# Patient Record
Sex: MALE | Race: WHITE | NOT HISPANIC OR LATINO
[De-identification: names, ages, dates, MRNs, and addresses within clinical notes are randomized per-mention and may not be internally consistent; named-entity substitution may affect disease eponyms.]

---

## 2021-05-03 ENCOUNTER — APPOINTMENT (OUTPATIENT)
Dept: DERMATOLOGY | Facility: CLINIC | Age: 36
End: 2021-05-03

## 2021-09-01 ENCOUNTER — EMERGENCY (EMERGENCY)
Facility: HOSPITAL | Age: 36
LOS: 1 days | Discharge: HOME | End: 2021-09-01
Attending: EMERGENCY MEDICINE
Payer: MEDICAID

## 2021-09-01 VITALS
OXYGEN SATURATION: 100 % | DIASTOLIC BLOOD PRESSURE: 90 MMHG | WEIGHT: 220.02 LBS | SYSTOLIC BLOOD PRESSURE: 136 MMHG | TEMPERATURE: 98 F | RESPIRATION RATE: 18 BRPM | HEIGHT: 74 IN | HEART RATE: 89 BPM

## 2021-09-01 LAB
ALBUMIN SERPL ELPH-MCNC: 4.9 G/DL — SIGNIFICANT CHANGE UP (ref 3.3–5.2)
ALP SERPL-CCNC: 78 U/L — SIGNIFICANT CHANGE UP (ref 40–120)
ALT FLD-CCNC: 34 U/L — SIGNIFICANT CHANGE UP
ANION GAP SERPL CALC-SCNC: 21 MMOL/L — HIGH (ref 5–17)
AST SERPL-CCNC: 35 U/L — SIGNIFICANT CHANGE UP
BASOPHILS # BLD AUTO: 0.03 K/UL — SIGNIFICANT CHANGE UP (ref 0–0.2)
BASOPHILS NFR BLD AUTO: 0.3 % — SIGNIFICANT CHANGE UP (ref 0–2)
BILIRUB SERPL-MCNC: 0.5 MG/DL — SIGNIFICANT CHANGE UP (ref 0.4–2)
BUN SERPL-MCNC: 21.5 MG/DL — HIGH (ref 8–20)
CALCIUM SERPL-MCNC: 9.9 MG/DL — SIGNIFICANT CHANGE UP (ref 8.6–10.2)
CHLORIDE SERPL-SCNC: 98 MMOL/L — SIGNIFICANT CHANGE UP (ref 98–107)
CO2 SERPL-SCNC: 21 MMOL/L — LOW (ref 22–29)
CREAT SERPL-MCNC: 1.21 MG/DL — SIGNIFICANT CHANGE UP (ref 0.5–1.3)
EOSINOPHIL # BLD AUTO: 0.19 K/UL — SIGNIFICANT CHANGE UP (ref 0–0.5)
EOSINOPHIL NFR BLD AUTO: 2.1 % — SIGNIFICANT CHANGE UP (ref 0–6)
GLUCOSE SERPL-MCNC: 143 MG/DL — HIGH (ref 70–99)
HCT VFR BLD CALC: 42.3 % — SIGNIFICANT CHANGE UP (ref 39–50)
HGB BLD-MCNC: 15.2 G/DL — SIGNIFICANT CHANGE UP (ref 13–17)
IMM GRANULOCYTES NFR BLD AUTO: 0.3 % — SIGNIFICANT CHANGE UP (ref 0–1.5)
LYMPHOCYTES # BLD AUTO: 2.72 K/UL — SIGNIFICANT CHANGE UP (ref 1–3.3)
LYMPHOCYTES # BLD AUTO: 29.4 % — SIGNIFICANT CHANGE UP (ref 13–44)
MCHC RBC-ENTMCNC: 31.1 PG — SIGNIFICANT CHANGE UP (ref 27–34)
MCHC RBC-ENTMCNC: 35.9 GM/DL — SIGNIFICANT CHANGE UP (ref 32–36)
MCV RBC AUTO: 86.5 FL — SIGNIFICANT CHANGE UP (ref 80–100)
MONOCYTES # BLD AUTO: 1.2 K/UL — HIGH (ref 0–0.9)
MONOCYTES NFR BLD AUTO: 13 % — SIGNIFICANT CHANGE UP (ref 2–14)
NEUTROPHILS # BLD AUTO: 5.09 K/UL — SIGNIFICANT CHANGE UP (ref 1.8–7.4)
NEUTROPHILS NFR BLD AUTO: 54.9 % — SIGNIFICANT CHANGE UP (ref 43–77)
PLATELET # BLD AUTO: 261 K/UL — SIGNIFICANT CHANGE UP (ref 150–400)
POTASSIUM SERPL-MCNC: 4.1 MMOL/L — SIGNIFICANT CHANGE UP (ref 3.5–5.3)
POTASSIUM SERPL-SCNC: 4.1 MMOL/L — SIGNIFICANT CHANGE UP (ref 3.5–5.3)
PROT SERPL-MCNC: 8 G/DL — SIGNIFICANT CHANGE UP (ref 6.6–8.7)
RBC # BLD: 4.89 M/UL — SIGNIFICANT CHANGE UP (ref 4.2–5.8)
RBC # FLD: 11.5 % — SIGNIFICANT CHANGE UP (ref 10.3–14.5)
SODIUM SERPL-SCNC: 140 MMOL/L — SIGNIFICANT CHANGE UP (ref 135–145)
WBC # BLD: 9.26 K/UL — SIGNIFICANT CHANGE UP (ref 3.8–10.5)
WBC # FLD AUTO: 9.26 K/UL — SIGNIFICANT CHANGE UP (ref 3.8–10.5)

## 2021-09-01 PROCEDURE — 36415 COLL VENOUS BLD VENIPUNCTURE: CPT

## 2021-09-01 PROCEDURE — 85025 COMPLETE CBC W/AUTO DIFF WBC: CPT

## 2021-09-01 PROCEDURE — 99284 EMERGENCY DEPT VISIT MOD MDM: CPT

## 2021-09-01 PROCEDURE — 99284 EMERGENCY DEPT VISIT MOD MDM: CPT | Mod: 25

## 2021-09-01 PROCEDURE — 80053 COMPREHEN METABOLIC PANEL: CPT

## 2021-09-01 PROCEDURE — 96374 THER/PROPH/DIAG INJ IV PUSH: CPT

## 2021-09-01 PROCEDURE — 96375 TX/PRO/DX INJ NEW DRUG ADDON: CPT

## 2021-09-01 RX ORDER — METOCLOPRAMIDE HCL 10 MG
10 TABLET ORAL ONCE
Refills: 0 | Status: COMPLETED | OUTPATIENT
Start: 2021-09-01 | End: 2021-09-01

## 2021-09-01 RX ORDER — ONDANSETRON 8 MG/1
4 TABLET, FILM COATED ORAL ONCE
Refills: 0 | Status: COMPLETED | OUTPATIENT
Start: 2021-09-01 | End: 2021-09-01

## 2021-09-01 RX ADMIN — ONDANSETRON 4 MILLIGRAM(S): 8 TABLET, FILM COATED ORAL at 19:59

## 2021-09-01 RX ADMIN — Medication 104 MILLIGRAM(S): at 21:12

## 2021-09-01 NOTE — ED PROVIDER NOTE - PROGRESS NOTE DETAILS
Pt. re-evaluated. Pt. states that he is less noxious. PT. however just vomited. IV reglan will be ordered. Father currently at the bedside. PT. now remembers that he snorted  some "Oxys". PT. denied any Suicidal/Homicidal ideations. Pt. feeling better. NO vomiting. No more nausea. Mother at the bedside and will take pt. home.

## 2021-09-01 NOTE — ED ADULT TRIAGE NOTE - CHIEF COMPLAINT QUOTE
pt presents to ED s/p opiate OD. as per EMS, pt was found unresponsive and choking with airway obstruction as he was eating prior to event. food removed from airway by ems. received 4mg nasal Narcan, 2mg IV Narcan with positive effect by EMS. pt A&Ox4 on arrival. MD Mcintyre called to bedside. pt changed to yellow gown.

## 2021-09-01 NOTE — ED PROVIDER NOTE - PATIENT PORTAL LINK FT
You can access the FollowMyHealth Patient Portal offered by [facility] by registering at the following website: [email or web address]. By joining [facility]’s FollowMyHealth portal, you will also be able to view your health information using other applications (apps) compatible with our system.

## 2021-09-01 NOTE — ED PROVIDER NOTE - OBJECTIVE STATEMENT
PT. with hx of substance abuse present to the ED after he was found unresponsive in his room. PT. had pinpoint pupils and  was "blue" appearing. PT. also had some food in his airway that was removed by EMS. PT. was then given 4mg of Intranasal Narcan and then he received 2mg IV Narcan. Pt. responded to that dose. Pt. then proceeded to pull out his IV. Pt. also vomited prior to coming to the ED. PT. in the ED, denies any shortness of breath. PT. was a little confused on the year(thought it was 2022), but he knew his name and where he was.

## 2021-09-01 NOTE — ED PROVIDER NOTE - CLINICAL SUMMARY MEDICAL DECISION MAKING FREE TEXT BOX
PT. s/p opiate overdose. PT. received narcan and responded. PT. c/o nausea. NO SOB. No chest pain. Father with patient. Will continue to observe.
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
## 2021-09-03 DIAGNOSIS — R41.0 DISORIENTATION, UNSPECIFIED: ICD-10-CM
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
## 2021-09-03 DIAGNOSIS — T40.601A POISONING BY UNSPECIFIED NARCOTICS, ACCIDENTAL (UNINTENTIONAL), INITIAL ENCOUNTER: ICD-10-CM

## 2023-04-21 PROBLEM — F19.10 OTHER PSYCHOACTIVE SUBSTANCE ABUSE, UNCOMPLICATED: Chronic | Status: ACTIVE | Noted: 2021-09-01

## 2023-04-21 PROBLEM — Z00.00 ENCOUNTER FOR PREVENTIVE HEALTH EXAMINATION: Status: ACTIVE | Noted: 2023-04-21

## 2023-05-01 ENCOUNTER — APPOINTMENT (OUTPATIENT)
Dept: SURGERY | Facility: CLINIC | Age: 38
End: 2023-05-01
Payer: MEDICAID

## 2023-05-01 VITALS
DIASTOLIC BLOOD PRESSURE: 81 MMHG | TEMPERATURE: 96.4 F | OXYGEN SATURATION: 97 % | RESPIRATION RATE: 16 BRPM | BODY MASS INDEX: 31.81 KG/M2 | SYSTOLIC BLOOD PRESSURE: 128 MMHG | HEART RATE: 76 BPM | WEIGHT: 240 LBS | HEIGHT: 73 IN

## 2023-05-01 DIAGNOSIS — R10.31 RIGHT LOWER QUADRANT PAIN: ICD-10-CM

## 2023-05-01 PROCEDURE — 99204 OFFICE O/P NEW MOD 45 MIN: CPT

## 2023-05-01 NOTE — HISTORY OF PRESENT ILLNESS
[de-identified] : This patient is a pleasant 37-year-old male with no significant past medical history who presents for evaluation of right groin pain that first started 2 months ago while he was playing hockey.  The patient states that he made a sudden lateral extension move with his left leg and immediately began experiencing pain in the groin region.  In the past 2 months the pain has been slowly subsiding.  He does not feel a bulge in the area.  He recently underwent an ultrasound where he was told that he had a 1 cm "tear" in the muscle.  He is eating well and having normal bowel function.  No testicular pain or swelling in the scrotum.  No dysuria.

## 2023-05-01 NOTE — PHYSICAL EXAM
[JVD] : no jugular venous distention  [Normal Breath Sounds] : Normal breath sounds [Normal Heart Sounds] : normal heart sounds [Normal Rate and Rhythm] : normal rate and rhythm [Abdominal Masses] : No abdominal masses [Abdomen Tenderness] : ~T ~M No abdominal tenderness [No HSM] : no hepatosplenomegaly [No Rash or Lesion] : No rash or lesion [Alert] : alert [Oriented to Person] : oriented to person [Oriented to Place] : oriented to place [Oriented to Time] : oriented to time [Calm] : calm [de-identified] : Well-appearing, no acute distress [de-identified] : PROSPER [de-identified] : Soft, nondistended, nontender, no discrete hernias palpated.  No point tenderness at pubic tubercle

## 2023-05-01 NOTE — ASSESSMENT
[FreeTextEntry1] : In brief, this patient is a pleasant 37-year-old male who presents with right groin pain after an extension injury while playing hockey.  On exam I cannot feel a discrete hernia and I believe the patient has a sports hernia, which is a muscle strain and not a true abdominal wall defect.  He states the pain is already improving I encouraged him to use daily warm compresses to the area.  I have referred him to pain management and physical therapy to help accelerate his healing.  If his pain does not resolve he should return to discuss possible surgery as a definitive management.

## 2023-07-16 ENCOUNTER — EMERGENCY (EMERGENCY)
Facility: HOSPITAL | Age: 38
LOS: 1 days | Discharge: HOME | End: 2023-07-16
Attending: EMERGENCY MEDICINE
Payer: MEDICAID

## 2023-07-16 VITALS
HEART RATE: 111 BPM | SYSTOLIC BLOOD PRESSURE: 132 MMHG | RESPIRATION RATE: 16 BRPM | HEIGHT: 75 IN | DIASTOLIC BLOOD PRESSURE: 61 MMHG | OXYGEN SATURATION: 100 % | TEMPERATURE: 98 F | WEIGHT: 229.94 LBS

## 2023-07-16 VITALS
RESPIRATION RATE: 16 BRPM | OXYGEN SATURATION: 98 % | DIASTOLIC BLOOD PRESSURE: 55 MMHG | HEART RATE: 81 BPM | TEMPERATURE: 98 F | SYSTOLIC BLOOD PRESSURE: 98 MMHG

## 2023-07-16 LAB
ALBUMIN SERPL ELPH-MCNC: 4.4 G/DL — SIGNIFICANT CHANGE UP (ref 3.3–5.2)
ALP SERPL-CCNC: 75 U/L — SIGNIFICANT CHANGE UP (ref 40–120)
ALT FLD-CCNC: 27 U/L — SIGNIFICANT CHANGE UP
ANION GAP SERPL CALC-SCNC: 15 MMOL/L — SIGNIFICANT CHANGE UP (ref 5–17)
APAP SERPL-MCNC: <3 UG/ML — LOW (ref 10–26)
APTT BLD: 24 SEC — LOW (ref 27.5–35.5)
AST SERPL-CCNC: 47 U/L — HIGH
BASOPHILS # BLD AUTO: 0.03 K/UL — SIGNIFICANT CHANGE UP (ref 0–0.2)
BASOPHILS NFR BLD AUTO: 0.2 % — SIGNIFICANT CHANGE UP (ref 0–2)
BILIRUB SERPL-MCNC: 0.4 MG/DL — SIGNIFICANT CHANGE UP (ref 0.4–2)
BUN SERPL-MCNC: 20.5 MG/DL — HIGH (ref 8–20)
CALCIUM SERPL-MCNC: 8.8 MG/DL — SIGNIFICANT CHANGE UP (ref 8.4–10.5)
CHLORIDE SERPL-SCNC: 97 MMOL/L — SIGNIFICANT CHANGE UP (ref 96–108)
CO2 SERPL-SCNC: 24 MMOL/L — SIGNIFICANT CHANGE UP (ref 22–29)
CREAT SERPL-MCNC: 1.05 MG/DL — SIGNIFICANT CHANGE UP (ref 0.5–1.3)
EGFR: 93 ML/MIN/1.73M2 — SIGNIFICANT CHANGE UP
EOSINOPHIL # BLD AUTO: 0 K/UL — SIGNIFICANT CHANGE UP (ref 0–0.5)
EOSINOPHIL NFR BLD AUTO: 0 % — SIGNIFICANT CHANGE UP (ref 0–6)
ETHANOL SERPL-MCNC: <10 MG/DL — SIGNIFICANT CHANGE UP (ref 0–9)
GLUCOSE SERPL-MCNC: 96 MG/DL — SIGNIFICANT CHANGE UP (ref 70–99)
HCT VFR BLD CALC: 40.2 % — SIGNIFICANT CHANGE UP (ref 39–50)
HGB BLD-MCNC: 13.7 G/DL — SIGNIFICANT CHANGE UP (ref 13–17)
IMM GRANULOCYTES NFR BLD AUTO: 0.7 % — SIGNIFICANT CHANGE UP (ref 0–0.9)
INR BLD: 1.13 RATIO — SIGNIFICANT CHANGE UP (ref 0.88–1.16)
LYMPHOCYTES # BLD AUTO: 0.83 K/UL — LOW (ref 1–3.3)
LYMPHOCYTES # BLD AUTO: 6.2 % — LOW (ref 13–44)
MCHC RBC-ENTMCNC: 30.7 PG — SIGNIFICANT CHANGE UP (ref 27–34)
MCHC RBC-ENTMCNC: 34.1 GM/DL — SIGNIFICANT CHANGE UP (ref 32–36)
MCV RBC AUTO: 90.1 FL — SIGNIFICANT CHANGE UP (ref 80–100)
MONOCYTES # BLD AUTO: 0.53 K/UL — SIGNIFICANT CHANGE UP (ref 0–0.9)
MONOCYTES NFR BLD AUTO: 3.9 % — SIGNIFICANT CHANGE UP (ref 2–14)
NEUTROPHILS # BLD AUTO: 11.94 K/UL — HIGH (ref 1.8–7.4)
NEUTROPHILS NFR BLD AUTO: 89 % — HIGH (ref 43–77)
PLATELET # BLD AUTO: 253 K/UL — SIGNIFICANT CHANGE UP (ref 150–400)
POTASSIUM SERPL-MCNC: 4.3 MMOL/L — SIGNIFICANT CHANGE UP (ref 3.5–5.3)
POTASSIUM SERPL-SCNC: 4.3 MMOL/L — SIGNIFICANT CHANGE UP (ref 3.5–5.3)
PROT SERPL-MCNC: 6.9 G/DL — SIGNIFICANT CHANGE UP (ref 6.6–8.7)
PROTHROM AB SERPL-ACNC: 13.1 SEC — SIGNIFICANT CHANGE UP (ref 10.5–13.4)
RBC # BLD: 4.46 M/UL — SIGNIFICANT CHANGE UP (ref 4.2–5.8)
RBC # FLD: 11.9 % — SIGNIFICANT CHANGE UP (ref 10.3–14.5)
SALICYLATES SERPL-MCNC: <0.6 MG/DL — LOW (ref 10–20)
SODIUM SERPL-SCNC: 136 MMOL/L — SIGNIFICANT CHANGE UP (ref 135–145)
WBC # BLD: 13.43 K/UL — HIGH (ref 3.8–10.5)
WBC # FLD AUTO: 13.43 K/UL — HIGH (ref 3.8–10.5)

## 2023-07-16 PROCEDURE — 36415 COLL VENOUS BLD VENIPUNCTURE: CPT

## 2023-07-16 PROCEDURE — 99285 EMERGENCY DEPT VISIT HI MDM: CPT

## 2023-07-16 PROCEDURE — 71045 X-RAY EXAM CHEST 1 VIEW: CPT | Mod: 26

## 2023-07-16 PROCEDURE — 93005 ELECTROCARDIOGRAM TRACING: CPT

## 2023-07-16 PROCEDURE — 85025 COMPLETE CBC W/AUTO DIFF WBC: CPT

## 2023-07-16 PROCEDURE — 71045 X-RAY EXAM CHEST 1 VIEW: CPT

## 2023-07-16 PROCEDURE — 96374 THER/PROPH/DIAG INJ IV PUSH: CPT

## 2023-07-16 PROCEDURE — 85730 THROMBOPLASTIN TIME PARTIAL: CPT

## 2023-07-16 PROCEDURE — 82962 GLUCOSE BLOOD TEST: CPT

## 2023-07-16 PROCEDURE — 80307 DRUG TEST PRSMV CHEM ANLYZR: CPT

## 2023-07-16 PROCEDURE — 93010 ELECTROCARDIOGRAM REPORT: CPT

## 2023-07-16 PROCEDURE — 99285 EMERGENCY DEPT VISIT HI MDM: CPT | Mod: 25

## 2023-07-16 PROCEDURE — 80053 COMPREHEN METABOLIC PANEL: CPT

## 2023-07-16 PROCEDURE — 85610 PROTHROMBIN TIME: CPT

## 2023-07-16 RX ORDER — ONDANSETRON 8 MG/1
4 TABLET, FILM COATED ORAL ONCE
Refills: 0 | Status: COMPLETED | OUTPATIENT
Start: 2023-07-16 | End: 2023-07-16

## 2023-07-16 RX ORDER — SODIUM CHLORIDE 9 MG/ML
1000 INJECTION INTRAMUSCULAR; INTRAVENOUS; SUBCUTANEOUS ONCE
Refills: 0 | Status: COMPLETED | OUTPATIENT
Start: 2023-07-16 | End: 2023-07-16

## 2023-07-16 RX ADMIN — ONDANSETRON 4 MILLIGRAM(S): 8 TABLET, FILM COATED ORAL at 17:56

## 2023-07-16 RX ADMIN — SODIUM CHLORIDE 1000 MILLILITER(S): 9 INJECTION INTRAMUSCULAR; INTRAVENOUS; SUBCUTANEOUS at 17:56

## 2023-07-16 NOTE — ED PROVIDER NOTE - ATTENDING CONTRIBUTION TO CARE
I, Darrius Buckner, personally saw the patient with the resident, and completed the key components of the history and physical exam. I then discussed the management plan with the resident.    38 yo M hx of polysubstance abuse p/w overdose on pain medication. Per EMS, patient received narcan 10 IN and then became aggitated and aggressive. He was given versed 10 mg IM. patient sleepy but arousable. Lab values do not require emergent intervention. patient observed. patient became more awake. tolerating PO. clinically sober. dc home with Narcan kit.

## 2023-07-16 NOTE — ED ADULT NURSE NOTE - OBJECTIVE STATEMENT
Assumed care of patient in critical care. Pt found unresponsive by [facility] in his bed unresponsive and received 10mg of narcan by pd. Pt was given 10mg of versed by ems for agitation. Pt awake and oriented but only responsive to painful stimulation. pt educated on plan of care, pt able to successfully teach back plan of care to RN, RN will continue to reeducate pt during hospital stay.

## 2023-07-16 NOTE — ED ADULT TRIAGE NOTE - CHIEF COMPLAINT QUOTE
Pt BIBA, found unresponsive in his bed, 10mg narcan given by PD, became combative, 10mg IM versed given by EMS, pt minimally responsive to painful stimuli, unable to answer RN questions, placed into yellow gown, Dr Helm called to bedside

## 2023-07-16 NOTE — ED ADULT NURSE REASSESSMENT NOTE - NS ED NURSE REASSESS COMMENT FT1
pt ambulated to bathroom with steady gait. tolerated po fluids. pt and mother/father educated to narcan administration with kit supplied

## 2023-07-16 NOTE — ED ADULT TRIAGE NOTE - STATUS:
Last office visit 6/13/19  Last refill date 10/15/19  # 30   Upcoming appointment with provider 12/17/19    
Applied

## 2023-07-16 NOTE — ED PROVIDER NOTE - NSFOLLOWUPINSTRUCTIONS_ED_ALL_ED_FT
Opioid Overdose  Opioids are drugs that are often used to treat pain. Opioids include illegal drugs, such as heroin, as well as prescription pain medicines, such as codeine, morphine, hydrocodone, and fentanyl.    An opioid overdose happens when you take too much of an opioid. An overdose may be intentional or accidental and can happen with any type of opioid.    The effects of an overdose can be mild, dangerous, or even deadly. Opioid overdose is a medical emergency.    What are the causes?  This condition may be caused by:  Taking too much of an opioid on purpose.  Taking too much of an opioid by accident.  Using two or more substances that contain opioids at the same time.  Taking an opioid with a substance that affects your heart, breathing, or blood pressure. These include alcohol, tranquilizers, sleeping pills, illegal drugs, and some over-the-counter medicines.  This condition may also happen due to an error made by:  A health care provider who prescribes a medicine.  The pharmacist who fills the prescription.  What increases the risk?  This condition is more likely in:  Children. They may be attracted to colorful pills. Because of a child's small size, even a small amount of a medicine can be dangerous.  Older people. They may be taking many different medicines. Older people may have difficulty reading labels or remembering when they last took their medicines. They may also be more sensitive to the effects of opioids.  People with chronic medical conditions, especially heart, liver, kidney, or neurological diseases.  People who take an opioid for a long period of time.  People who take opioids and use illegal drugs, such as heroin, or other substances, such as alcohol.  People who:  Have a history of drug or alcohol abuse.  Have certain mental health conditions.  Have a history of previous drug overdoses.  People who take opioids that are not prescribed for them.  What are the signs or symptoms?  Symptoms of this condition depend on the type of opioid and the amount that was taken. Common symptoms include:  Sleepiness or difficulty waking from sleep.  Confusion.  Slurred speech.  Slowed breathing and a slow pulse (bradycardia).  Nausea and vomiting.  Abnormally small pupils.  Signs and symptoms that require emergency treatment include:  Cold, clammy, and pale skin.  Blue lips and fingernails.  Vomiting.  Gurgling sounds in the throat.  A pulse that is very slow or difficult to detect.  Breathing that is very irregular, slow, noisy, or difficult to detect.  Inability to respond to speech or be awakened from sleep (stupor).  Seizures.  How is this diagnosed?  This condition is diagnosed based on your symptoms and medical history. It is important to tell your health care provider:  About all of the opioids that you took.  When you took the opioids.  Whether you were drinking alcohol or using marijuana, cocaine, or other drugs.  Your health care provider will do a physical exam. This exam may include:  Checking and monitoring your heart rate and rhythm, breathing rate, temperature, and blood pressure.  Measuring oxygen levels in your blood.  Checking for abnormally small pupils.  You may also have blood tests or urine tests. You may have X-rays if you are having severe breathing problems.    How is this treated?  This condition requires immediate medical treatment and hospitalization. Reversing the effects of the opioid is the first step in treatment. If you have a Narcan kit or naloxone, use it right away. Follow your health care provider's instructions. A friend or family member can also help you with this.    The rest of your treatment will be given in the hospital intensive care (ICU). Treatment in the hospital may include:  Giving salts and minerals (electrolytes) along with fluids through an IV.  Inserting a breathing tube (endotracheal tube) in your airway to help you breathe if you cannot breathe on your own or you are in danger of not being able to breathe on your own.  Giving oxygen through a small tube under your nose.  Passing a tube through your nose and into your stomach (nasogastric tube, or NG tube) to empty your stomach.  Giving medicines that:  Increase your blood pressure.  Relieve nausea and vomiting.  Relieve abdominal pain and cramping.  Reverse the effects of the opioid (naloxone).  Monitoring your heart and oxygen levels.  Ongoing counseling and mental health support if you intentionally overdosed or used an illegal drug.  Follow these instructions at home:  Three cups showing dark yellow, yellow, and pale yellow urine.  Medicines    Take over-the-counter and prescription medicines only as told by your health care provider.  Always ask your health care provider about possible side effects and interactions of any new medicine that you start taking.  Keep a list of all the medicines that you take, including over-the-counter medicines. Bring this list with you to all your medical visits.  General instructions    Drink enough fluid to keep your urine pale yellow.  Keep all follow-up visits. This is important.  How is this prevented?  Read the drug inserts that come with your opioid pain medicines.  Take medicines only as told by your health care provider. Do not take more medicine than you are told. Do not take medicines more frequently than you are told.  Do not drink alcohol or take sedatives when taking opioids.  Do not use illegal or recreational drugs, including cocaine, ecstasy, and marijuana.  Do not take opioid medicines that are not prescribed for you.  Store all medicines in safety containers that are out of the reach of children.  Get help if you are struggling with:  Alcohol or drug use.  Depression or another mental health problem.  Thoughts of hurting yourself or another person.  Keep the phone number of your local poison control center near your phone or in your mobile phone. In the [place] the hotline of the National Poison Control Center is (701) 850-3517.  If you were prescribed naloxone, make sure you understand how to take it.  Contact a health care provider if:  You need help understanding how to take your pain medicines.  You feel your medicines are too strong.  You are concerned that your pain medicines are not working well for your pain.  You develop new symptoms or side effects when you are taking medicines.  Get help right away if:  You or someone else is having symptoms of an opioid overdose. Get help even if you are not sure.  You have thoughts about hurting yourself or others.  You have:  Chest pain.  Difficulty breathing.  A loss of consciousness.  These symptoms may represent a serious problem that is an emergency. Do not wait to see if the symptoms will go away. Get medical help right away. Call your local emergency services (526 in the [place] Do not drive yourself to the hospital.    If you ever feel like you may hurt yourself or others, or have thoughts about taking your own life, get help right away. You can go to your nearest emergency department or:  Call your local emergency services (696 in the [place]  Call a suicide crisis helpline, such as the National Suicide Prevention Lifeline at 1-794.993.8524 or 788 in the [place] This is open 24 hours a day in the [place]  Text the Crisis Text Line at 547499 (in the [place]  Summary  Opioids are drugs that are often used to treat pain. Opioids include illegal drugs, such as heroin, as well as prescription pain medicines.  An opioid overdose happens when you take too much of an opioid.  Overdoses can be intentional or accidental.  Opioid overdose is very dangerous. It is a life-threatening emergency.  If you or someone you know is experiencing an opioid overdose, get help right away.  This information is not intended to replace advice given to you by your health care provider. Make sure you discuss any questions you have with your health care provider.

## 2023-07-16 NOTE — ED PROVIDER NOTE - PATIENT PORTAL LINK FT
You can access the FollowMyHealth Patient Portal offered by [facility] by registering at the following website: [email or web address]. By joining [employer]’s FollowMyHealth portal, you will also be able to view your health information using other applications (apps) compatible with our system.

## 2023-07-16 NOTE — ED PROVIDER NOTE - OBJECTIVE STATEMENT
Patient is a 38 yo male with PMHx polysubstance abuse, overdose BIBEMS s/p overdose. As per EMS patient was found unresponsive in his bed at which point PD arrived, gave patient 10 mg IN narcan; patient became combative, aggressive, agitated and received 10 mg IM versed. Patient vss, lethargic, resting comfortably, aaox1. Protecting airway. No signs of trauma.

## 2023-07-16 NOTE — ED PROVIDER NOTE - PHYSICAL EXAMINATION
Constitutional: Lethargic, oriented to person, in no distress, protecting airway   ENMT: Airway patent nasal mucosa clear. Mouth with normal mucosa. Throat has no vesicles no oropharyngeal exudates and uvula is midline. No blood in the oropharynx  EYES: clear bilaterally, pupils equal, round and reactive to light  Cardiac: Tachycardic, regular rhythm. Heart sounds S1, S2. No murmurs, rubs or gallops. Good capillary refill, 2+ pulses, no peripheral edema  Respiratory: Lungs CTAB, no use of accessory muscles, no crackles, satting 99% on RA in no distress  Gastrointestinal: Abdomen nondistended, non-tender, no rebound guarding or peritoneal signs  Genitourinary: No CVA tenderness, pelvis nontender, bladder nondistended  Musculoskeletal: Spine appears normal, range of motion is not limited, no muscle or joint tenderness  Neurological: Oriented to self, protecting airway, lethargic appearing but arousable to stimuli, moving extremities equally   Skin: No signs of trauma, no lacerations

## 2023-07-16 NOTE — ED ADULT NURSE REASSESSMENT NOTE - NS ED NURSE REASSESS COMMENT FT1
recvd pt from day rn, Pt in no apparent distress at this time. Airway patent, breathing spontaneous and nonlabored. Pt A&Ox3 resting in stretcher. Pt no complaints at this time. pt in yellow gown, on monitor, toelrating po fluids. family at bedside

## 2023-07-16 NOTE — ED ADULT NURSE REASSESSMENT NOTE - NS ED NURSE REASSESS COMMENT FT1
Pt AOx3. VSS. Pt c/o of overdose. Pt reporting N/V. Medication administered per orders. Pt denies pain. Respirations even and unlabored. Pt resting comfortably at this time.

## 2023-07-16 NOTE — ED PROVIDER NOTE - CLINICAL SUMMARY MEDICAL DECISION MAKING FREE TEXT BOX
Patient is a 36 yo male with PMHx polysubstance abuse, overdose BIBEMS s/p overdose. As per EMS patient was found unresponsive in his bed at which point PD arrived, gave patient 10 mg IN narcan; patient became combative, aggressive, agitated and received 10 mg IM versed. Patient vss, lethargic, resting comfortably, aaox1. Protecting airway.       Will check labs, evaluate for intoxication with UDS BAL, r/o pulmonary edema with CXR, place on EtCO2 to monitor for hypercapnia, place on 1:1 given agitation prior to benzos, continue to monitor.